# Patient Record
(demographics unavailable — no encounter records)

---

## 2024-11-26 NOTE — REVIEW OF SYSTEMS
[Nasal Congestion] : no nasal congestion [Cough] : cough [SOB on Exertion] : sob on exertion [GERD] : gerd [Negative] : Musculoskeletal

## 2024-11-26 NOTE — HISTORY OF PRESENT ILLNESS
[Never] : never [TextBox_4] : 66 year old woman with history of asthma and DM, non smoker,, referred from .  Presented there on 10/1/24 with URI symptoms, low grade fever, cough, nasal congestion.  CXR was clear.  RVP negative for influenza and COVID. Given Doxycycline 100 BID for 7 days.    Prior to this was admitted to [facility] for pneumonia in late September and also treated with antibiotics. Son states treated wtih Ceftriaxone. She recovered.  Asthma history:  asthma since her 20s, hospitalized a few times.  Uses Symbicort as needed.  Uses albuterol via nebulizer every night.   Reports cough and shortness of breath.   Has reflux, denies nasal congestion. Has daily symptoms.

## 2024-11-26 NOTE — ASSESSMENT
[FreeTextEntry1] : 66 year old woman with asthma since her 20s, recent hospitalization for pneumonia at [facility] in September with asthma that is not well controlled.  She reports cough and shortness of breath.  Uses albuterol via nebulizer every night and not using symbicort regularly.  Plan: - complete PFTs -exhaled NO - Symbicort 160/4.5 2 puffs BID with albuterol as needed - given spacer to use. - F/U at time of PFTs - influenza vaccine today.

## 2025-01-21 NOTE — HISTORY OF PRESENT ILLNESS
[Never] : never [TextBox_4] : 66 year old woman with asthma since her 20s, recent hospitalization for pneumonia at [facility] in September with asthma that is not well controlled.  She was treated with antibiotics and recovered well. Seen initially after that hospitalization and placed on Symbicort 160/4.5 2 puffs twice daily with albuterol as needed.  Returns for follow up today: Feels better on symbicort, but ran out two days ago. Accompanied by son who is translating.  Has not needed to use albuterol via nebulizer.  No URIS no ashtma exacerbations since she was last seen.

## 2025-01-21 NOTE — ASSESSMENT
[FreeTextEntry1] : 66 year old woman with asthma since her 20s, recent hospitalization for pneumonia at [facility] in September . She was started on Symbicort 160/4.5 with improvement in her symptoms.  She has not had to use albuterol recently.  On PE today her VSS LUngs are clear Cor RRR ext wihtout edema PFTs today show a moderate obstructive ventilatory impairment with a positive bronchodilator response.  Lung volumes and DLCO are WNL. Exhaled NO today is 16 which is low.  IMpression:  Asthma, with positive bronchodilator response.  Did not refill ICS/LABA but feels better overall.  Plan: - Continue Symbicort 160/4.5 2 puffs BID with albuterol as needed - F/U in 6 months.